# Patient Record
Sex: MALE
[De-identification: names, ages, dates, MRNs, and addresses within clinical notes are randomized per-mention and may not be internally consistent; named-entity substitution may affect disease eponyms.]

---

## 2018-09-04 ENCOUNTER — HOSPITAL ENCOUNTER (EMERGENCY)
Dept: HOSPITAL 42 - ED | Age: 58
LOS: 1 days | End: 2018-09-05
Payer: MEDICAID

## 2018-09-04 VITALS — BODY MASS INDEX: 33 KG/M2

## 2018-09-04 DIAGNOSIS — I46.9: Primary | ICD-10-CM

## 2018-09-04 NOTE — ED PDOC
Arrival/HPI





- General


Chief Complaint: Cardiac Arrest


Time Seen by Provider: 18 23:54


Historian: Patient


EM Caveat: Acuity of Condition, Intubated





- Critical Care


Critical Care Minutes: 60 minutes





- History of Present Illness


Narrative History of Present Illness (Text): 





18 23:55





58 year old male, with a past medical history of HTN and internal defibrillator

, presents to the emergency department by EMS in cardiac arrest  30 minutes 

prior.  Patient went into  witnessed cardiac arrest,  received 30 minutes of 

CPR prior to arrival at the hospital. Initial rhythm detected was PEA  and 

atient was given 5 doses of epinephrine in route. There were no shocks 

delivered prior to arrival at the hospital.   Patient was also given bicarbonate

, albuterol, calcium gluconate and magnesium in the field.





A more complete HPI was unable to be obtained due to the patient's clinical 

condition  





Time/Duration: 1/2 hour


Symptom Onset: Sudden


Context: Walking, Home





Past Medical History





- Provider Review


Nursing Documentation Reviewed: Yes





- Travel History


Have you recently traveled outside [place] w/in the past 3 mons?: No





- Infectious Disease


Hx of Infectious Diseases: None





- Cardiac


Hx Hypertension: Yes


Hx Mitral Valve Prolapse: Yes





- Psychiatric


Hx Substance Use: No





- Surgical History


Hx Cardiac Catheterization: Yes


Hx Coronary Stent: Yes (x2)





- Anesthesia


Hx Anesthesia: No





Family/Social History





- Physician Review


Nursing Documentation Reviewed: Yes


Family/Social History: No Known Family HX


Smoking Status: Unknown If Ever Smoked


Hx Alcohol Use: No


Hx Substance Use: No





Allergies/Home Meds


Allergies/Adverse Reactions: 


Allergies





Unobtainable Allergy (Verified 18 02:05)


 








Home Medications: 


 Home Meds











 Medication  Instructions  Recorded  Confirmed


 


Unobtainable  18














Review of Systems





- Review of Systems


Systems not reviewed;Unavailable: Intubated





Physical Exam





- Physical Exam


Physical Exam Limitations: Clinical Condition





- Systems Exam


Head: Present: Atraumatic, Normocephalic


Pupils: Present: Non-Reactive


Conjunctiva: Present: Normal


Mouth: Present: Other (Intubated)


Respiratory/Chest: Present: Clear to Auscultation, Good Air Exchange (b/l 

breath sounds )


Cardiovascular: Present: Irregular Rhythm (Pulseless Electrical Activity)


Abdomen: Present: Normal Bowel Sounds


Lower Extremity: Present: Cyanosis


Skin: Present: Dry, Cold, Pale





Medical Decision Making


ED Course and Treatment: 








Progress Notes


18 22:43


Patient immediately placed onto monitor and found to be in PEA. Compressions 

continued. 6th epinephrine given.


18 22:46


Compressions held for pulse check. No detectable pulse. Compressions resumed. 

Epinephrine given. Bedside ultrasound reveals no cardiac activity.Discussion 

with patient's sister(witnessed arrest) who requests for resuscitation efforts 

to be continued until her brother and nieces(power of ) arrive to the 

hospital. Treatment team notified and will continue efforts for ROSC.


18 23:50


Discussion with the family  who make decision to terminate resuscitative 

efforts. Treatment team notified. Daughters request autopsy. Medical examiner 

notified.














- Scribe Statement


The provider has reviewed the documentation as recorded by the Scribe





Hilario Leonard





All medical record entries made by the Scribe were at my direction and 

personally dictated by me. I have reviewed the chart and agree that the record 

accurately reflects my personal performance of the history, physical exam, 

medical decision making, and the department course for this patient. I have 

also personally directed, reviewed, and agree with the discharge instructions 

and disposition.





Disposition/Present on Arrival





- Present on Arrival


Any Indicators Present on Arrival: No


History of DVT/PE: No


History of Uncontrolled Diabetes: No


Urinary Catheter: No


History of Decub. Ulcer: No


History Surgical Site Infection Following: None





- Disposition


Have Diagnosis and Disposition been Completed?: Yes


Diagnosis: 


 Cardiopulmonary arrest





Disposition:  WITH WITHOUT AUTOPSY


Disposition Time: 23:50


Condition: 


Referrals: 


Fredi Sandy MD [Family Provider] - Follow up with primary


Forms:  [facility] (English)